# Patient Record
Sex: MALE | Race: WHITE | URBAN - METROPOLITAN AREA
[De-identification: names, ages, dates, MRNs, and addresses within clinical notes are randomized per-mention and may not be internally consistent; named-entity substitution may affect disease eponyms.]

---

## 2017-11-14 ENCOUNTER — FOLLOW-UP (OUTPATIENT)
Dept: URBAN - METROPOLITAN AREA CLINIC 14 | Facility: CLINIC | Age: 72
Setting detail: DERMATOLOGY
End: 2017-11-14

## 2017-11-14 DIAGNOSIS — L72.8 OTHER FOLLICULAR CYSTS OF THE SKIN AND SUBCUTANEOUS TISSUE: ICD-10-CM

## 2017-11-14 PROBLEM — Z85828 V10.83: Status: ACTIVE | Noted: 2017-11-14

## 2017-11-14 PROCEDURE — 17004 DESTROY PREMAL LESIONS 15/>: CPT

## 2017-11-14 PROCEDURE — 99213 OFFICE O/P EST LOW 20 MIN: CPT

## 2018-05-14 ENCOUNTER — FOLLOW-UP (OUTPATIENT)
Dept: URBAN - METROPOLITAN AREA CLINIC 14 | Facility: CLINIC | Age: 73
Setting detail: DERMATOLOGY
End: 2018-05-14

## 2018-05-14 ENCOUNTER — RX ONLY (RX ONLY)
Age: 73
End: 2018-05-14

## 2018-05-14 DIAGNOSIS — L20.89 OTHER ATOPIC DERMATITIS: ICD-10-CM

## 2018-05-14 PROBLEM — Z85828 V10.83: Status: ACTIVE | Noted: 2018-05-14

## 2018-05-14 PROCEDURE — 99213 OFFICE O/P EST LOW 20 MIN: CPT

## 2018-05-14 RX ORDER — FLUOROURACIL 2 G/40G
1 APPLICATION CREAM TOPICAL BID
Qty: 40 | Refills: 0 | Status: DISCONTINUED
Start: 2018-05-14 | End: 2019-08-02

## 2018-05-29 ENCOUNTER — FOLLOW-UP (OUTPATIENT)
Dept: URBAN - METROPOLITAN AREA CLINIC 14 | Facility: CLINIC | Age: 73
Setting detail: DERMATOLOGY
End: 2018-05-29

## 2018-05-29 PROCEDURE — 99212 OFFICE O/P EST SF 10 MIN: CPT

## 2019-01-24 ENCOUNTER — FOLLOW-UP (OUTPATIENT)
Dept: URBAN - METROPOLITAN AREA CLINIC 14 | Facility: CLINIC | Age: 74
Setting detail: DERMATOLOGY
End: 2019-01-24

## 2019-01-24 DIAGNOSIS — L57.0 ACTINIC KERATOSIS: ICD-10-CM

## 2019-01-24 PROCEDURE — 17000 DESTRUCT PREMALG LESION: CPT

## 2019-01-24 PROCEDURE — 17003 DESTRUCT PREMALG LES 2-14: CPT

## 2019-01-24 PROCEDURE — 99212 OFFICE O/P EST SF 10 MIN: CPT

## 2019-01-24 PROCEDURE — 11900 INJECT SKIN LESIONS </W 7: CPT

## 2019-08-02 ENCOUNTER — SKIN CHECK (OUTPATIENT)
Dept: URBAN - METROPOLITAN AREA CLINIC 14 | Facility: CLINIC | Age: 74
Setting detail: DERMATOLOGY
End: 2019-08-02

## 2019-08-02 DIAGNOSIS — Z85.828 PERSONAL HISTORY OF OTHER MALIGNANT NEOPLASM OF SKIN: ICD-10-CM

## 2019-08-02 DIAGNOSIS — D22.5 MELANOCYTIC NEVI OF TRUNK: ICD-10-CM

## 2019-08-02 DIAGNOSIS — D18.01 HEMANGIOMA OF SKIN AND SUBCUTANEOUS TISSUE: ICD-10-CM

## 2019-08-02 DIAGNOSIS — L82.1 OTHER SEBORRHEIC KERATOSIS: ICD-10-CM

## 2019-08-02 DIAGNOSIS — L81.4 OTHER MELANIN HYPERPIGMENTATION: ICD-10-CM

## 2019-08-02 PROBLEM — Z85828 V10.83: Status: ACTIVE | Noted: 2019-08-02

## 2019-08-02 PROCEDURE — 17000 DESTRUCT PREMALG LESION: CPT

## 2019-08-02 PROCEDURE — 17003 DESTRUCT PREMALG LES 2-14: CPT

## 2019-08-02 PROCEDURE — 99213 OFFICE O/P EST LOW 20 MIN: CPT

## 2020-03-11 ENCOUNTER — FOLLOW-UP (OUTPATIENT)
Dept: URBAN - METROPOLITAN AREA CLINIC 14 | Facility: CLINIC | Age: 75
Setting detail: DERMATOLOGY
End: 2020-03-11

## 2020-03-11 DIAGNOSIS — D48.5 NEOPLASM OF UNCERTAIN BEHAVIOR OF SKIN: ICD-10-CM

## 2020-03-11 PROCEDURE — 99213 OFFICE O/P EST LOW 20 MIN: CPT

## 2020-03-11 PROCEDURE — 11900 INJECT SKIN LESIONS </W 7: CPT

## 2021-02-04 RX ORDER — ADAPALENE AND BENZOYL PEROXIDE 3; 25 MG/G; MG/G
1 ML GEL TOPICAL NIGHTLY
Qty: 45 | Refills: 3
Start: 2021-02-04

## 2021-02-04 RX ORDER — SULFACETAMIDE SODIUM, SULFUR 98; 48 MG/G; MG/G
1 APPLICATION LIQUID TOPICAL DAILY
Qty: 285 | Refills: 3
Start: 2021-02-04

## 2021-02-04 RX ORDER — SPIRONOLACTONE 50 MG/1
1 TABLET TABLET, FILM COATED ORAL BID
Qty: 180 | Refills: 1
Start: 2021-02-04

## 2021-02-04 RX ORDER — CLINDAMYCIN PHOSPHATE 10 MG/G
1 APPLICATION GEL TOPICAL DAILY
Qty: 30 | Refills: 2
Start: 2021-02-04

## 2021-02-05 ENCOUNTER — FOLLOW-UP (OUTPATIENT)
Dept: URBAN - METROPOLITAN AREA CLINIC 14 | Facility: CLINIC | Age: 76
Setting detail: DERMATOLOGY
End: 2021-02-05

## 2021-02-05 DIAGNOSIS — L57.0 ACTINIC KERATOSIS: ICD-10-CM

## 2021-02-05 PROBLEM — Z85828 V10.83: Status: ACTIVE | Noted: 2021-02-05

## 2021-02-05 PROCEDURE — 17003 DESTRUCT PREMALG LES 2-14: CPT

## 2021-02-05 PROCEDURE — 99213 OFFICE O/P EST LOW 20 MIN: CPT

## 2021-02-05 PROCEDURE — 17000 DESTRUCT PREMALG LESION: CPT

## 2021-08-05 ENCOUNTER — SKIN CHECK (OUTPATIENT)
Dept: URBAN - METROPOLITAN AREA CLINIC 14 | Facility: CLINIC | Age: 76
Setting detail: DERMATOLOGY
End: 2021-08-05

## 2021-08-05 DIAGNOSIS — Z85.828 PERSONAL HISTORY OF OTHER MALIGNANT NEOPLASM OF SKIN: ICD-10-CM

## 2021-08-05 DIAGNOSIS — L90.5 SCAR CONDITIONS AND FIBROSIS OF SKIN: ICD-10-CM

## 2021-08-05 PROBLEM — Z85828 V10.83: Status: ACTIVE | Noted: 2021-08-05

## 2021-08-05 PROCEDURE — 17000 DESTRUCT PREMALG LESION: CPT

## 2021-08-05 PROCEDURE — 99213 OFFICE O/P EST LOW 20 MIN: CPT

## 2021-08-05 PROCEDURE — 17003 DESTRUCT PREMALG LES 2-14: CPT

## 2021-08-05 PROCEDURE — 11900 INJECT SKIN LESIONS </W 7: CPT

## 2021-08-05 PROCEDURE — OTHER COSMETIC: OTHER

## 2021-08-12 ENCOUNTER — RX ONLY (RX ONLY)
Age: 76
End: 2021-08-12

## 2021-08-12 ENCOUNTER — OTHER- (OUTPATIENT)
Dept: URBAN - METROPOLITAN AREA CLINIC 14 | Facility: CLINIC | Age: 76
Setting detail: DERMATOLOGY
End: 2021-08-12

## 2021-08-12 DIAGNOSIS — L81.4 OTHER MELANIN HYPERPIGMENTATION: ICD-10-CM

## 2021-08-12 DIAGNOSIS — L57.8 OTHER SKIN CHANGES DUE TO CHRONIC EXPOSURE TO NONIONIZING RADIATION: ICD-10-CM

## 2021-08-12 DIAGNOSIS — L82.1 OTHER SEBORRHEIC KERATOSIS: ICD-10-CM

## 2021-08-12 DIAGNOSIS — L70.0 ACNE VULGARIS: ICD-10-CM

## 2021-08-12 DIAGNOSIS — D22.5 MELANOCYTIC NEVI OF TRUNK: ICD-10-CM

## 2021-08-12 PROCEDURE — 99213 OFFICE O/P EST LOW 20 MIN: CPT

## 2021-08-12 PROCEDURE — 36415 COLL VENOUS BLD VENIPUNCTURE: CPT

## 2021-08-12 RX ORDER — PREDNISONE 10 MG/1
3 TABLET TABLET ORAL EVERY MORNING
Qty: 42 | Refills: 0
Start: 2021-08-12

## 2021-08-19 ENCOUNTER — FOLLOW-UP (OUTPATIENT)
Dept: URBAN - METROPOLITAN AREA CLINIC 14 | Facility: CLINIC | Age: 76
Setting detail: DERMATOLOGY
End: 2021-08-19

## 2021-08-19 DIAGNOSIS — D48.5 NEOPLASM OF UNCERTAIN BEHAVIOR OF SKIN: ICD-10-CM

## 2021-08-19 PROCEDURE — 99214 OFFICE O/P EST MOD 30 MIN: CPT

## 2022-01-21 ENCOUNTER — OTHER- (OUTPATIENT)
Dept: URBAN - METROPOLITAN AREA CLINIC 14 | Facility: CLINIC | Age: 77
Setting detail: DERMATOLOGY
End: 2022-01-21

## 2022-01-21 ENCOUNTER — RX ONLY (RX ONLY)
Age: 77
End: 2022-01-21

## 2022-01-21 DIAGNOSIS — D22.5 MELANOCYTIC NEVI OF TRUNK: ICD-10-CM

## 2022-01-21 DIAGNOSIS — Z87.2 PERSONAL HISTORY OF DISEASES OF THE SKIN AND SUBCUTANEOUS TISSUE: ICD-10-CM

## 2022-01-21 DIAGNOSIS — L90.5 SCAR CONDITIONS AND FIBROSIS OF SKIN: ICD-10-CM

## 2022-01-21 DIAGNOSIS — L72.8 OTHER FOLLICULAR CYSTS OF THE SKIN AND SUBCUTANEOUS TISSUE: ICD-10-CM

## 2022-01-21 DIAGNOSIS — L81.4 OTHER MELANIN HYPERPIGMENTATION: ICD-10-CM

## 2022-01-21 PROBLEM — Z85828 V10.83: Status: ACTIVE | Noted: 2022-01-21

## 2022-01-21 PROCEDURE — 11900 INJECT SKIN LESIONS </W 7: CPT

## 2022-01-21 RX ORDER — DOXYCYCLINE HYCLATE 100 MG/1
1 CAPSULE CAPSULE, GELATIN COATED ORAL TWICE A DAY
Qty: 14 | Refills: 0
Start: 2022-01-21

## 2022-02-21 ENCOUNTER — FOLLOW-UP (OUTPATIENT)
Dept: URBAN - METROPOLITAN AREA CLINIC 14 | Facility: CLINIC | Age: 77
Setting detail: DERMATOLOGY
End: 2022-02-21

## 2022-02-21 DIAGNOSIS — D48.5 NEOPLASM OF UNCERTAIN BEHAVIOR OF SKIN: ICD-10-CM

## 2022-02-21 PROCEDURE — 17003 DESTRUCT PREMALG LES 2-14: CPT

## 2022-02-21 PROCEDURE — 99213 OFFICE O/P EST LOW 20 MIN: CPT

## 2022-02-21 PROCEDURE — 17000 DESTRUCT PREMALG LESION: CPT

## 2022-10-04 ENCOUNTER — APPOINTMENT (OUTPATIENT)
Dept: URBAN - METROPOLITAN AREA CLINIC 210 | Age: 77
Setting detail: DERMATOLOGY
End: 2022-10-04

## 2022-10-04 DIAGNOSIS — L29.8 OTHER PRURITUS: ICD-10-CM

## 2022-10-04 DIAGNOSIS — L50.1 IDIOPATHIC URTICARIA: ICD-10-CM

## 2022-10-04 PROCEDURE — OTHER TREATMENT REGIMEN: OTHER

## 2022-10-04 PROCEDURE — OTHER PRESCRIPTION: OTHER

## 2022-10-04 PROCEDURE — 99214 OFFICE O/P EST MOD 30 MIN: CPT | Mod: 25

## 2022-10-04 PROCEDURE — OTHER COUNSELING: OTHER

## 2022-10-04 PROCEDURE — OTHER INJECTION: OTHER

## 2022-10-04 PROCEDURE — 11900 INJECT SKIN LESIONS </W 7: CPT

## 2022-10-04 PROCEDURE — OTHER MIPS QUALITY: OTHER

## 2022-10-04 RX ORDER — TRIAMCINOLONE ACETONIDE 1 MG/G
CREAM TOPICAL
Qty: 454 | Refills: 1 | Status: ERX | COMMUNITY
Start: 2022-10-04

## 2022-10-04 RX ORDER — PREDNISONE 10 MG/1
TABLET ORAL
Qty: 42 | Refills: 0 | Status: ERX | COMMUNITY
Start: 2022-10-04

## 2022-10-04 ASSESSMENT — LOCATION DETAILED DESCRIPTION DERM
LOCATION DETAILED: LEFT POSTERIOR SHOULDER
LOCATION DETAILED: RIGHT POSTERIOR SHOULDER
LOCATION DETAILED: STERNUM
LOCATION DETAILED: LEFT MEDIAL INFERIOR CHEST
LOCATION DETAILED: SUPERIOR THORACIC SPINE
LOCATION DETAILED: EPIGASTRIC SKIN
LOCATION DETAILED: PERIUMBILICAL SKIN
LOCATION DETAILED: RIGHT BUTTOCK
LOCATION DETAILED: RIGHT SUPERIOR MEDIAL MIDBACK
LOCATION DETAILED: RIGHT INFERIOR MEDIAL UPPER BACK

## 2022-10-04 ASSESSMENT — LOCATION SIMPLE DESCRIPTION DERM
LOCATION SIMPLE: RIGHT UPPER BACK
LOCATION SIMPLE: RIGHT SHOULDER
LOCATION SIMPLE: LEFT SHOULDER
LOCATION SIMPLE: UPPER BACK
LOCATION SIMPLE: RIGHT BUTTOCK
LOCATION SIMPLE: CHEST
LOCATION SIMPLE: RIGHT LOWER BACK
LOCATION SIMPLE: ABDOMEN

## 2022-10-04 ASSESSMENT — LOCATION ZONE DERM
LOCATION ZONE: TRUNK
LOCATION ZONE: ARM

## 2022-10-04 NOTE — PROCEDURE: TREATMENT REGIMEN
Otc Regimen: Begin taking 2 Claritin every morning.\\nBegin taking 2 zyzal every night.
Detail Level: Zone
Plan: Follow up as needed
Initiate Treatment: Begin taking Prednisone 10mg, take 3 tablets po QAM x 7 days, then 2 tablets x 7 days, then 1 tablet x 7 days.\\nBegin Applying Triamcinolone Cream to affected areas BID x 2 weeks as needed for itching and irritation.

## 2022-10-28 ENCOUNTER — RX ONLY (RX ONLY)
Age: 77
End: 2022-10-28

## 2022-10-28 RX ORDER — MONTELUKAST SODIUM 10 MG/1
TABLET, FILM COATED ORAL
Qty: 60 | Refills: 1 | Status: ERX | COMMUNITY
Start: 2022-10-28

## 2022-10-28 RX ORDER — PREDNISONE 2.5 MG/1
TABLET ORAL
Qty: 21 | Refills: 0 | Status: ERX | COMMUNITY
Start: 2022-10-28

## 2022-11-30 ENCOUNTER — RX ONLY (RX ONLY)
Age: 77
End: 2022-11-30

## 2022-11-30 RX ORDER — PREDNISONE 2.5 MG/1
TABLET ORAL
Qty: 30 | Refills: 0 | Status: ERX

## 2022-12-02 ENCOUNTER — APPOINTMENT (OUTPATIENT)
Dept: URBAN - METROPOLITAN AREA CLINIC 210 | Age: 77
Setting detail: DERMATOLOGY
End: 2022-12-08

## 2022-12-02 ENCOUNTER — RX ONLY (RX ONLY)
Age: 77
End: 2022-12-02

## 2022-12-02 DIAGNOSIS — L50.1 IDIOPATHIC URTICARIA: ICD-10-CM

## 2022-12-02 DIAGNOSIS — L0391 CELLULITIS AND ABSCESS OF UNSPECIFIED SITES: ICD-10-CM

## 2022-12-02 DIAGNOSIS — L0390 CELLULITIS AND ABSCESS OF UNSPECIFIED SITES: ICD-10-CM

## 2022-12-02 PROBLEM — L02.212 CUTANEOUS ABSCESS OF BACK [ANY PART, EXCEPT BUTTOCK]: Status: ACTIVE | Noted: 2022-12-02

## 2022-12-02 PROCEDURE — OTHER MIPS QUALITY: OTHER

## 2022-12-02 PROCEDURE — OTHER INJECTION: OTHER

## 2022-12-02 PROCEDURE — OTHER INCISION AND DRAINAGE: OTHER

## 2022-12-02 PROCEDURE — OTHER PRESCRIPTION: OTHER

## 2022-12-02 PROCEDURE — 10060 I&D ABSCESS SIMPLE/SINGLE: CPT

## 2022-12-02 PROCEDURE — 96372 THER/PROPH/DIAG INJ SC/IM: CPT | Mod: 59

## 2022-12-02 PROCEDURE — 99213 OFFICE O/P EST LOW 20 MIN: CPT | Mod: 25

## 2022-12-02 PROCEDURE — OTHER COUNSELING: OTHER

## 2022-12-02 RX ORDER — DOXYCYCLINE HYCLATE 100 MG/1
CAPSULE, GELATIN COATED ORAL
Qty: 14 | Refills: 0 | Status: ERX | COMMUNITY
Start: 2022-12-02

## 2022-12-02 RX ORDER — PREDNISONE 10 MG/1
TABLET ORAL
Qty: 84 | Refills: 0 | Status: CANCELLED

## 2022-12-02 RX ORDER — PREDNISONE 10 MG/1
TABLET ORAL
Qty: 84 | Refills: 0 | Status: ERX

## 2022-12-02 ASSESSMENT — LOCATION SIMPLE DESCRIPTION DERM
LOCATION SIMPLE: LOWER BACK
LOCATION SIMPLE: LEFT GLUTEUS MEDIUS
LOCATION SIMPLE: RIGHT GLUTEUS MEDIUS

## 2022-12-02 ASSESSMENT — LOCATION DETAILED DESCRIPTION DERM
LOCATION DETAILED: LEFT GLUTEUS MEDIUS
LOCATION DETAILED: SUPERIOR LUMBAR SPINE
LOCATION DETAILED: RIGHT GLUTEUS MEDIUS

## 2022-12-02 ASSESSMENT — LOCATION ZONE DERM
LOCATION ZONE: BUTTOCK
LOCATION ZONE: TRUNK

## 2022-12-02 NOTE — PROCEDURE: INCISION AND DRAINAGE
Detail Level: Detailed
Lesion Type: Abscess
Method: 8 mm punch
Curette: No
Anesthesia Type: 1% lidocaine with epinephrine
Packing?: iodoform packing strips
Size Of Lesion In Cm (Optional But May Be Required For Some Insurances): 0
Wound Care: Mupirocin
Dressing: pressure dressing with telfa
Epidermal Sutures: 4-0 Ethilon
Epidermal Closure: simple interrupted
Suture Text: The incision was partially closed with
Preparation Text: The area was prepped in the usual clean fashion.
Curette Text (Optional): After the contents were expressed a curette was used to partially remove the cyst wall.
Consent was obtained and risks were reviewed including but not limited to delayed wound healing, infection, need for multiple I and D's, and pain.
Post-Care Instructions: I reviewed with the patient in detail post-care instructions. Patient should keep wound covered and call the office should any redness, pain, swelling or worsening occur.

## 2022-12-05 ENCOUNTER — APPOINTMENT (OUTPATIENT)
Dept: URBAN - METROPOLITAN AREA CLINIC 210 | Age: 77
Setting detail: DERMATOLOGY
End: 2023-01-02

## 2022-12-05 DIAGNOSIS — L0391 CELLULITIS AND ABSCESS OF UNSPECIFIED SITES: ICD-10-CM

## 2022-12-05 DIAGNOSIS — L0390 CELLULITIS AND ABSCESS OF UNSPECIFIED SITES: ICD-10-CM

## 2022-12-05 PROBLEM — L02.212 CUTANEOUS ABSCESS OF BACK [ANY PART, EXCEPT BUTTOCK]: Status: ACTIVE | Noted: 2022-12-05

## 2022-12-05 PROCEDURE — OTHER MIPS QUALITY: OTHER

## 2022-12-05 PROCEDURE — OTHER ADDITIONAL NOTES: OTHER

## 2022-12-05 ASSESSMENT — LOCATION SIMPLE DESCRIPTION DERM: LOCATION SIMPLE: RIGHT UPPER BACK

## 2022-12-05 ASSESSMENT — LOCATION DETAILED DESCRIPTION DERM: LOCATION DETAILED: RIGHT INFERIOR MEDIAL UPPER BACK

## 2022-12-05 ASSESSMENT — LOCATION ZONE DERM: LOCATION ZONE: TRUNK

## 2022-12-05 NOTE — PROCEDURE: ADDITIONAL NOTES
Additional Notes: No wick was preset at office visit today, irrigated with peroxide and place clean dressing sit mupirocen. Pt to continue applying mupirocen twice daily with bandage for 1 week, washing area daily with soap and water.
Detail Level: Simple
Render Risk Assessment In Note?: no

## 2023-02-20 ENCOUNTER — APPOINTMENT (OUTPATIENT)
Dept: URBAN - METROPOLITAN AREA CLINIC 210 | Age: 78
Setting detail: DERMATOLOGY
End: 2023-02-27

## 2023-02-20 DIAGNOSIS — D18.0 HEMANGIOMA: ICD-10-CM

## 2023-02-20 DIAGNOSIS — L57.0 ACTINIC KERATOSIS: ICD-10-CM

## 2023-02-20 DIAGNOSIS — L57.8 OTHER SKIN CHANGES DUE TO CHRONIC EXPOSURE TO NONIONIZING RADIATION: ICD-10-CM

## 2023-02-20 DIAGNOSIS — Z12.83 ENCOUNTER FOR SCREENING FOR MALIGNANT NEOPLASM OF SKIN: ICD-10-CM

## 2023-02-20 DIAGNOSIS — L82.1 OTHER SEBORRHEIC KERATOSIS: ICD-10-CM

## 2023-02-20 PROBLEM — D18.01 HEMANGIOMA OF SKIN AND SUBCUTANEOUS TISSUE: Status: ACTIVE | Noted: 2023-02-20

## 2023-02-20 PROCEDURE — 99212 OFFICE O/P EST SF 10 MIN: CPT | Mod: 25

## 2023-02-20 PROCEDURE — OTHER COUNSELING: OTHER

## 2023-02-20 PROCEDURE — OTHER MIPS QUALITY: OTHER

## 2023-02-20 PROCEDURE — 17000 DESTRUCT PREMALG LESION: CPT

## 2023-02-20 PROCEDURE — 17003 DESTRUCT PREMALG LES 2-14: CPT

## 2023-02-20 PROCEDURE — OTHER LIQUID NITROGEN: OTHER

## 2023-02-20 ASSESSMENT — LOCATION DETAILED DESCRIPTION DERM
LOCATION DETAILED: LEFT SUPERIOR LATERAL FOREHEAD
LOCATION DETAILED: RIGHT MEDIAL SUPERIOR CHEST
LOCATION DETAILED: RIGHT PROXIMAL DORSAL FOREARM
LOCATION DETAILED: LEFT FOREHEAD
LOCATION DETAILED: RIGHT LATERAL MALAR CHEEK
LOCATION DETAILED: LEFT CLAVICULAR NECK
LOCATION DETAILED: PERIUMBILICAL SKIN
LOCATION DETAILED: RIGHT INFERIOR MEDIAL FOREHEAD
LOCATION DETAILED: RIGHT DISTAL POSTERIOR UPPER ARM
LOCATION DETAILED: RIGHT CENTRAL MALAR CHEEK
LOCATION DETAILED: SUPERIOR THORACIC SPINE
LOCATION DETAILED: LEFT PROXIMAL DORSAL FOREARM
LOCATION DETAILED: LEFT DISTAL LATERAL POSTERIOR UPPER ARM
LOCATION DETAILED: LEFT MID TEMPLE
LOCATION DETAILED: RIGHT SUPERIOR MEDIAL MIDBACK
LOCATION DETAILED: RIGHT MID TEMPLE
LOCATION DETAILED: RIGHT SUPERIOR CRUS OF ANTIHELIX
LOCATION DETAILED: LEFT INFERIOR TEMPLE
LOCATION DETAILED: LEFT CENTRAL PARIETAL SCALP
LOCATION DETAILED: LEFT SUPERIOR FOREHEAD

## 2023-02-20 ASSESSMENT — LOCATION SIMPLE DESCRIPTION DERM
LOCATION SIMPLE: LEFT FOREARM
LOCATION SIMPLE: RIGHT TEMPLE
LOCATION SIMPLE: RIGHT EAR
LOCATION SIMPLE: CHEST
LOCATION SIMPLE: LEFT ANTERIOR NECK
LOCATION SIMPLE: SCALP
LOCATION SIMPLE: RIGHT CHEEK
LOCATION SIMPLE: LEFT UPPER ARM
LOCATION SIMPLE: RIGHT UPPER ARM
LOCATION SIMPLE: ABDOMEN
LOCATION SIMPLE: RIGHT FOREHEAD
LOCATION SIMPLE: RIGHT LOWER BACK
LOCATION SIMPLE: LEFT FOREHEAD
LOCATION SIMPLE: RIGHT FOREARM
LOCATION SIMPLE: UPPER BACK
LOCATION SIMPLE: LEFT TEMPLE

## 2023-02-20 ASSESSMENT — LOCATION ZONE DERM
LOCATION ZONE: NECK
LOCATION ZONE: TRUNK
LOCATION ZONE: SCALP
LOCATION ZONE: ARM
LOCATION ZONE: FACE
LOCATION ZONE: EAR

## 2023-02-20 NOTE — PROCEDURE: LIQUID NITROGEN
Render Post-Care Instructions In Note?: no
Number Of Freeze-Thaw Cycles: 1 freeze-thaw cycle
Duration Of Freeze Thaw-Cycle (Seconds): 5
Post-Care Instructions: I reviewed with the patient in detail post-care instructions. Patient is to wear sunprotection, and avoid picking at any of the treated lesions. Pt may apply Vaseline to crusted or scabbing areas.
Application Tool (Optional): Cry-AC
Show Aperture Variable?: Yes
Consent: The patient's consent was obtained including but not limited to risks of crusting, scabbing, blistering, scarring, darker or lighter pigmentary change, recurrence, incomplete removal and infection.
Detail Level: Detailed

## 2023-02-20 NOTE — PROCEDURE: MIPS QUALITY
Quality 47: Advance Care Plan: Advance Care Planning discussed and documented in the medical record; patient did not wish or was not able to name a surrogate decision maker or provide an advance care plan.
Quality 110: Preventive Care And Screening: Influenza Immunization: Influenza Immunization Administered during Influenza season
Quality 226: Preventive Care And Screening: Tobacco Use: Screening And Cessation Intervention: Patient screened for tobacco use and is an ex/non-smoker
Detail Level: Detailed
Quality 111:Pneumonia Vaccination Status For Older Adults: Pneumococcal vaccine (PPSV23) administered on or after patient’s 60th birthday and before the end of the measurement period
Quality 130: Documentation Of Current Medications In The Medical Record: Current Medications Documented
Quality 431: Preventive Care And Screening: Unhealthy Alcohol Use - Screening: Patient not identified as an unhealthy alcohol user when screened for unhealthy alcohol use using a systematic screening method

## 2023-08-21 ENCOUNTER — APPOINTMENT (OUTPATIENT)
Dept: URBAN - METROPOLITAN AREA CLINIC 210 | Age: 78
Setting detail: DERMATOLOGY
End: 2023-08-27

## 2023-08-21 DIAGNOSIS — L57.0 ACTINIC KERATOSIS: ICD-10-CM

## 2023-08-21 DIAGNOSIS — Z12.83 ENCOUNTER FOR SCREENING FOR MALIGNANT NEOPLASM OF SKIN: ICD-10-CM

## 2023-08-21 DIAGNOSIS — D18.0 HEMANGIOMA: ICD-10-CM

## 2023-08-21 DIAGNOSIS — L82.1 OTHER SEBORRHEIC KERATOSIS: ICD-10-CM

## 2023-08-21 PROBLEM — D18.01 HEMANGIOMA OF SKIN AND SUBCUTANEOUS TISSUE: Status: ACTIVE | Noted: 2023-08-21

## 2023-08-21 PROCEDURE — OTHER LIQUID NITROGEN: OTHER

## 2023-08-21 PROCEDURE — OTHER MIPS QUALITY: OTHER

## 2023-08-21 PROCEDURE — 99213 OFFICE O/P EST LOW 20 MIN: CPT | Mod: 25

## 2023-08-21 PROCEDURE — OTHER COUNSELING: OTHER

## 2023-08-21 PROCEDURE — 17003 DESTRUCT PREMALG LES 2-14: CPT

## 2023-08-21 PROCEDURE — 17000 DESTRUCT PREMALG LESION: CPT

## 2023-08-21 ASSESSMENT — LOCATION DETAILED DESCRIPTION DERM
LOCATION DETAILED: RIGHT DISTAL POSTERIOR UPPER ARM
LOCATION DETAILED: RIGHT INFERIOR MEDIAL FOREHEAD
LOCATION DETAILED: RIGHT DISTAL RADIAL DORSAL FOREARM
LOCATION DETAILED: SUPERIOR THORACIC SPINE
LOCATION DETAILED: LEFT DISTAL LATERAL POSTERIOR UPPER ARM
LOCATION DETAILED: NASAL TIP
LOCATION DETAILED: RIGHT SUPERIOR MEDIAL MIDBACK
LOCATION DETAILED: RIGHT MEDIAL SUPERIOR CHEST
LOCATION DETAILED: PERIUMBILICAL SKIN
LOCATION DETAILED: RIGHT DISTAL DORSAL FOREARM

## 2023-08-21 ASSESSMENT — LOCATION SIMPLE DESCRIPTION DERM
LOCATION SIMPLE: RIGHT UPPER ARM
LOCATION SIMPLE: CHEST
LOCATION SIMPLE: NOSE
LOCATION SIMPLE: RIGHT LOWER BACK
LOCATION SIMPLE: LEFT UPPER ARM
LOCATION SIMPLE: UPPER BACK
LOCATION SIMPLE: RIGHT FOREHEAD
LOCATION SIMPLE: RIGHT FOREARM
LOCATION SIMPLE: ABDOMEN

## 2023-08-21 ASSESSMENT — LOCATION ZONE DERM
LOCATION ZONE: ARM
LOCATION ZONE: TRUNK
LOCATION ZONE: FACE
LOCATION ZONE: NOSE

## 2023-08-21 NOTE — PROCEDURE: LIQUID NITROGEN
Show Applicator Variable?: Yes
Number Of Freeze-Thaw Cycles: 1 freeze-thaw cycle
Render Post-Care Instructions In Note?: no
Application Tool (Optional): Cry-AC
Consent: The patient's consent was obtained including but not limited to risks of crusting, scabbing, blistering, scarring, darker or lighter pigmentary change, recurrence, incomplete removal and infection.
Duration Of Freeze Thaw-Cycle (Seconds): 5
Post-Care Instructions: I reviewed with the patient in detail post-care instructions. Patient is to wear sunprotection, and avoid picking at any of the treated lesions. Pt may apply Vaseline to crusted or scabbing areas.
Detail Level: Detailed

## 2024-06-21 ENCOUNTER — APPOINTMENT (OUTPATIENT)
Dept: URBAN - METROPOLITAN AREA CLINIC 210 | Age: 79
Setting detail: DERMATOLOGY
End: 2024-06-24

## 2024-06-21 DIAGNOSIS — L57.8 OTHER SKIN CHANGES DUE TO CHRONIC EXPOSURE TO NONIONIZING RADIATION: ICD-10-CM

## 2024-06-21 DIAGNOSIS — R23.3 SPONTANEOUS ECCHYMOSES: ICD-10-CM

## 2024-06-21 DIAGNOSIS — D18.0 HEMANGIOMA: ICD-10-CM

## 2024-06-21 DIAGNOSIS — D69.2 OTHER NONTHROMBOCYTOPENIC PURPURA: ICD-10-CM

## 2024-06-21 DIAGNOSIS — L82.1 OTHER SEBORRHEIC KERATOSIS: ICD-10-CM

## 2024-06-21 DIAGNOSIS — L81.4 OTHER MELANIN HYPERPIGMENTATION: ICD-10-CM

## 2024-06-21 DIAGNOSIS — D22 MELANOCYTIC NEVI: ICD-10-CM

## 2024-06-21 DIAGNOSIS — L57.0 ACTINIC KERATOSIS: ICD-10-CM

## 2024-06-21 PROBLEM — D18.01 HEMANGIOMA OF SKIN AND SUBCUTANEOUS TISSUE: Status: ACTIVE | Noted: 2024-06-21

## 2024-06-21 PROBLEM — D22.5 MELANOCYTIC NEVI OF TRUNK: Status: ACTIVE | Noted: 2024-06-21

## 2024-06-21 PROCEDURE — OTHER PRESCRIPTION: OTHER

## 2024-06-21 PROCEDURE — OTHER PRESCRIPTION MEDICATION MANAGEMENT: OTHER

## 2024-06-21 PROCEDURE — 99213 OFFICE O/P EST LOW 20 MIN: CPT | Mod: 25

## 2024-06-21 PROCEDURE — OTHER LIQUID NITROGEN: OTHER

## 2024-06-21 PROCEDURE — OTHER COUNSELING: OTHER

## 2024-06-21 PROCEDURE — OTHER MIPS QUALITY: OTHER

## 2024-06-21 PROCEDURE — 17000 DESTRUCT PREMALG LESION: CPT

## 2024-06-21 RX ORDER — FLUOROURACIL 0.04 G/G
CREAM TOPICAL
Qty: 40 | Refills: 1 | Status: ERX | COMMUNITY
Start: 2024-06-21

## 2024-06-21 ASSESSMENT — LOCATION SIMPLE DESCRIPTION DERM
LOCATION SIMPLE: LEFT HAND
LOCATION SIMPLE: INFERIOR FOREHEAD
LOCATION SIMPLE: RIGHT HAND
LOCATION SIMPLE: LEFT UPPER ARM
LOCATION SIMPLE: LEFT UPPER BACK
LOCATION SIMPLE: LEFT FOREHEAD
LOCATION SIMPLE: RIGHT TEMPLE
LOCATION SIMPLE: CHEST
LOCATION SIMPLE: ABDOMEN
LOCATION SIMPLE: UPPER BACK
LOCATION SIMPLE: RIGHT LOWER BACK
LOCATION SIMPLE: RIGHT ANKLE
LOCATION SIMPLE: RIGHT FOREARM
LOCATION SIMPLE: LEFT TEMPLE

## 2024-06-21 ASSESSMENT — LOCATION DETAILED DESCRIPTION DERM
LOCATION DETAILED: LEFT RADIAL DORSAL HAND
LOCATION DETAILED: LEFT ULNAR DORSAL HAND
LOCATION DETAILED: RIGHT MEDIAL SUPERIOR CHEST
LOCATION DETAILED: RIGHT RADIAL DORSAL HAND
LOCATION DETAILED: INFERIOR MID FOREHEAD
LOCATION DETAILED: RIGHT SUPERIOR LATERAL MIDBACK
LOCATION DETAILED: LEFT SUPERIOR MEDIAL FOREHEAD
LOCATION DETAILED: LEFT MID-UPPER BACK
LOCATION DETAILED: XIPHOID
LOCATION DETAILED: SUPERIOR THORACIC SPINE
LOCATION DETAILED: RIGHT PROXIMAL DORSAL FOREARM
LOCATION DETAILED: RIGHT INFERIOR TEMPLE
LOCATION DETAILED: LEFT DISTAL POSTERIOR UPPER ARM
LOCATION DETAILED: EPIGASTRIC SKIN
LOCATION DETAILED: LEFT MID TEMPLE
LOCATION DETAILED: RIGHT ANKLE

## 2024-06-21 ASSESSMENT — LOCATION ZONE DERM
LOCATION ZONE: TRUNK
LOCATION ZONE: ARM
LOCATION ZONE: FACE
LOCATION ZONE: LEG
LOCATION ZONE: HAND

## 2024-06-21 NOTE — PROCEDURE: LIQUID NITROGEN
Duration Of Freeze Thaw-Cycle (Seconds): 3
Post-Care Instructions: I reviewed with the patient in detail post-care instructions. Patient is to wear sunprotection, and avoid picking at any of the treated lesions. Pt may apply Vaseline to crusted or scabbing areas.
Show Aperture Variable?: Yes
Consent: The patient's consent was obtained including but not limited to risks of crusting, scabbing, blistering, scarring, darker or lighter pigmentary change, recurrence, incomplete removal and infection.
Detail Level: Detailed
Number Of Freeze-Thaw Cycles: 2 freeze-thaw cycles
Render Post-Care Instructions In Note?: no
Application Tool (Optional): Cry-AC

## 2024-06-21 NOTE — PROCEDURE: PRESCRIPTION MEDICATION MANAGEMENT
Detail Level: Zone
Initiate Treatment: [place] 4 % topical cream \\nQuantity: 40.0 g  Days Supply: 30\\nSig: Apply a thin layer to affected skin to hairline, forehead, and temples once at night for 2 weeks, stop sooner if it becomes painful. Take a month off, then retreat.
Render In Strict Bullet Format?: No

## 2025-04-08 NOTE — HPI: EVALUATION OF SKIN LESION(S)
What Type Of Note Output Would You Prefer (Optional)?: Bullet Format
Have You Had Fillers Before?: has had fillers
How Severe Are Your Spot(S)?: moderate
Have Your Spot(S) Been Treated In The Past?: has not been treated
Hpi Title: Evaluation of Skin Lesions